# Patient Record
Sex: FEMALE | Race: WHITE | NOT HISPANIC OR LATINO | Employment: FULL TIME | URBAN - METROPOLITAN AREA
[De-identification: names, ages, dates, MRNs, and addresses within clinical notes are randomized per-mention and may not be internally consistent; named-entity substitution may affect disease eponyms.]

---

## 2018-11-25 ENCOUNTER — HOSPITAL ENCOUNTER (EMERGENCY)
Facility: HOSPITAL | Age: 28
Discharge: HOME | End: 2018-11-25
Attending: EMERGENCY MEDICINE | Admitting: EMERGENCY MEDICINE

## 2018-11-25 ENCOUNTER — APPOINTMENT (OUTPATIENT)
Dept: GENERAL RADIOLOGY | Facility: HOSPITAL | Age: 28
End: 2018-11-25

## 2018-11-25 VITALS
WEIGHT: 175 LBS | HEART RATE: 119 BPM | HEIGHT: 62 IN | SYSTOLIC BLOOD PRESSURE: 119 MMHG | DIASTOLIC BLOOD PRESSURE: 77 MMHG | RESPIRATION RATE: 20 BRPM | BODY MASS INDEX: 32.2 KG/M2 | TEMPERATURE: 97.7 F | OXYGEN SATURATION: 95 %

## 2018-11-25 DIAGNOSIS — J06.9 ACUTE URI: Primary | ICD-10-CM

## 2018-11-25 DIAGNOSIS — R07.81 PLEURITIC CHEST PAIN: ICD-10-CM

## 2018-11-25 LAB
ALBUMIN SERPL-MCNC: 4.45 G/DL (ref 3.2–4.8)
ALBUMIN/GLOB SERPL: 1.7 G/DL (ref 1.5–2.5)
ALP SERPL-CCNC: 64 U/L (ref 25–100)
ALT SERPL W P-5'-P-CCNC: 19 U/L (ref 7–40)
ANION GAP SERPL CALCULATED.3IONS-SCNC: 5 MMOL/L (ref 3–11)
AST SERPL-CCNC: 20 U/L (ref 0–33)
BASOPHILS # BLD AUTO: 0.04 10*3/MM3 (ref 0–0.2)
BASOPHILS NFR BLD AUTO: 0.4 % (ref 0–1)
BILIRUB SERPL-MCNC: 0.3 MG/DL (ref 0.3–1.2)
BUN BLD-MCNC: 14 MG/DL (ref 9–23)
BUN/CREAT SERPL: 20.3 (ref 7–25)
CALCIUM SPEC-SCNC: 10 MG/DL (ref 8.7–10.4)
CHLORIDE SERPL-SCNC: 106 MMOL/L (ref 99–109)
CO2 SERPL-SCNC: 26 MMOL/L (ref 20–31)
CREAT BLD-MCNC: 0.69 MG/DL (ref 0.6–1.3)
DEPRECATED RDW RBC AUTO: 44.5 FL (ref 37–54)
EOSINOPHIL # BLD AUTO: 0.7 10*3/MM3 (ref 0–0.3)
EOSINOPHIL NFR BLD AUTO: 7.6 % (ref 0–3)
ERYTHROCYTE [DISTWIDTH] IN BLOOD BY AUTOMATED COUNT: 13.5 % (ref 11.3–14.5)
GFR SERPL CREATININE-BSD FRML MDRD: 101 ML/MIN/1.73
GLOBULIN UR ELPH-MCNC: 2.7 GM/DL
GLUCOSE BLD-MCNC: 97 MG/DL (ref 70–100)
HCT VFR BLD AUTO: 41.6 % (ref 34.5–44)
HGB BLD-MCNC: 13.6 G/DL (ref 11.5–15.5)
IMM GRANULOCYTES # BLD: 0.02 10*3/MM3 (ref 0–0.03)
IMM GRANULOCYTES NFR BLD: 0.2 % (ref 0–0.6)
LYMPHOCYTES # BLD AUTO: 2.54 10*3/MM3 (ref 0.6–4.8)
LYMPHOCYTES NFR BLD AUTO: 27.5 % (ref 24–44)
MCH RBC QN AUTO: 29.8 PG (ref 27–31)
MCHC RBC AUTO-ENTMCNC: 32.7 G/DL (ref 32–36)
MCV RBC AUTO: 91.2 FL (ref 80–99)
MONOCYTES # BLD AUTO: 0.67 10*3/MM3 (ref 0–1)
MONOCYTES NFR BLD AUTO: 7.3 % (ref 0–12)
NEUTROPHILS # BLD AUTO: 5.29 10*3/MM3 (ref 1.5–8.3)
NEUTROPHILS NFR BLD AUTO: 57.2 % (ref 41–71)
PLATELET # BLD AUTO: 321 10*3/MM3 (ref 150–450)
PMV BLD AUTO: 10.9 FL (ref 6–12)
POTASSIUM BLD-SCNC: 3.8 MMOL/L (ref 3.5–5.5)
PROT SERPL-MCNC: 7.1 G/DL (ref 5.7–8.2)
RBC # BLD AUTO: 4.56 10*6/MM3 (ref 3.89–5.14)
SODIUM BLD-SCNC: 137 MMOL/L (ref 132–146)
TROPONIN I SERPL-MCNC: <0.006 NG/ML
WBC NRBC COR # BLD: 9.24 10*3/MM3 (ref 3.5–10.8)

## 2018-11-25 PROCEDURE — 80053 COMPREHEN METABOLIC PANEL: CPT | Performed by: NURSE PRACTITIONER

## 2018-11-25 PROCEDURE — 85025 COMPLETE CBC W/AUTO DIFF WBC: CPT | Performed by: NURSE PRACTITIONER

## 2018-11-25 PROCEDURE — 93005 ELECTROCARDIOGRAM TRACING: CPT

## 2018-11-25 PROCEDURE — 93005 ELECTROCARDIOGRAM TRACING: CPT | Performed by: EMERGENCY MEDICINE

## 2018-11-25 PROCEDURE — 71046 X-RAY EXAM CHEST 2 VIEWS: CPT

## 2018-11-25 PROCEDURE — 84484 ASSAY OF TROPONIN QUANT: CPT | Performed by: NURSE PRACTITIONER

## 2018-11-25 PROCEDURE — 96360 HYDRATION IV INFUSION INIT: CPT

## 2018-11-25 PROCEDURE — 99284 EMERGENCY DEPT VISIT MOD MDM: CPT

## 2018-11-25 RX ORDER — SODIUM CHLORIDE 0.9 % (FLUSH) 0.9 %
10 SYRINGE (ML) INJECTION AS NEEDED
Status: DISCONTINUED | OUTPATIENT
Start: 2018-11-25 | End: 2018-11-25 | Stop reason: HOSPADM

## 2018-11-25 RX ORDER — AZITHROMYCIN 250 MG/1
TABLET, FILM COATED ORAL
Qty: 6 TABLET | Refills: 0 | Status: SHIPPED | OUTPATIENT
Start: 2018-11-25

## 2018-11-25 RX ORDER — NAPROXEN 375 MG/1
375 TABLET ORAL 2 TIMES DAILY PRN
Qty: 14 TABLET | Refills: 0 | Status: SHIPPED | OUTPATIENT
Start: 2018-11-25

## 2018-11-25 RX ADMIN — SODIUM CHLORIDE 1000 ML: 9 INJECTION, SOLUTION INTRAVENOUS at 17:30

## 2018-11-25 NOTE — ED PROVIDER NOTES
Subjective   Ms. Reshma Wu is a 28 year old female who presents to the ED with c/o chest pain. Patient reports that she developed midsternal chest pain this morning around 0100. Tried going to bed, but notes that she was unable to secondary to the pain. Patient laments that this chest pain has remained constant since then and has inhibited her from doing normal activities. This brings the patient to the ED this evening. In the emergency room, patient continues to endorse central chest pain. Describes the pain as a sharp sensation and notes that it worsens with deep breathing and coughing. Patient endorses accompanying chest/nasal congestion, dry mouth, and cough with green sputum production but denies any shortness of breath, fever, or chills. No palpitations. No bladder/bowel changes. Not pregnant (no recent sexual activity). Non-smoker. Past medical history includes hypertension, however patient admits she hasn't taken her medications in at least a month.        History provided by:  Patient  Chest Pain   Pain location:  Substernal area  Pain quality: sharp    Pain radiates to:  Does not radiate  Pain severity:  Moderate  Onset quality:  Sudden  Duration:  16 hours  Timing:  Constant  Progression:  Unchanged  Relieved by:  Nothing  Worsened by:  Deep breathing and coughing  Associated symptoms: cough    Associated symptoms: no abdominal pain, no back pain, no diaphoresis, no fever, no headache, no nausea, no near-syncope, no numbness, no palpitations, no shortness of breath and no vomiting    Risk factors: hypertension and obesity    Risk factors: no diabetes mellitus, no high cholesterol, not male, not pregnant and no smoking        Review of Systems   Constitutional: Negative for chills, diaphoresis and fever.   HENT: Positive for congestion. Negative for sore throat.    Respiratory: Positive for cough. Negative for shortness of breath.    Cardiovascular: Positive for chest pain. Negative for palpitations,  leg swelling and near-syncope.   Gastrointestinal: Negative for abdominal pain, blood in stool, constipation, diarrhea, nausea and vomiting.   Genitourinary: Negative for decreased urine volume, difficulty urinating, dysuria, flank pain, frequency, hematuria and urgency.   Musculoskeletal: Negative for back pain.   Neurological: Negative for numbness and headaches.   All other systems reviewed and are negative.      History reviewed. No pertinent past medical history.    No Known Allergies    Past Surgical History:   Procedure Laterality Date   • BREAST SURGERY     • KNEE SURGERY     • WRIST SURGERY         History reviewed. No pertinent family history.    Social History     Socioeconomic History   • Marital status: Significant Other     Spouse name: Not on file   • Number of children: Not on file   • Years of education: Not on file   • Highest education level: Not on file   Tobacco Use   • Smoking status: Never Smoker   Substance and Sexual Activity   • Alcohol use: Yes     Comment: socially   • Drug use: No   • Sexual activity: Defer         Objective   Physical Exam   Constitutional: She is oriented to person, place, and time. She appears well-developed and well-nourished. No distress.   HENT:   Head: Normocephalic and atraumatic.   Eyes: Conjunctivae are normal. No scleral icterus.   Neck: Normal range of motion. Neck supple.   Cardiovascular: Normal rate, regular rhythm, normal heart sounds and intact distal pulses. Exam reveals no gallop and no friction rub.   No murmur heard.  Pulmonary/Chest: Effort normal and breath sounds normal. No respiratory distress. She has no wheezes. She has no rales. She exhibits no tenderness.   Abdominal: Soft. Bowel sounds are normal. There is no tenderness. There is no guarding.   Musculoskeletal: Normal range of motion.   Neurological: She is alert and oriented to person, place, and time.   Skin: Skin is warm and dry. She is not diaphoretic.   Psychiatric: She has a normal  mood and affect. Her behavior is normal.   Nursing note and vitals reviewed.      Procedures         ED Course       Recent Results (from the past 24 hour(s))   Comprehensive Metabolic Panel    Collection Time: 11/25/18  5:30 PM   Result Value Ref Range    Glucose 97 70 - 100 mg/dL    BUN 14 9 - 23 mg/dL    Creatinine 0.69 0.60 - 1.30 mg/dL    Sodium 137 132 - 146 mmol/L    Potassium 3.8 3.5 - 5.5 mmol/L    Chloride 106 99 - 109 mmol/L    CO2 26.0 20.0 - 31.0 mmol/L    Calcium 10.0 8.7 - 10.4 mg/dL    Total Protein 7.1 5.7 - 8.2 g/dL    Albumin 4.45 3.20 - 4.80 g/dL    ALT (SGPT) 19 7 - 40 U/L    AST (SGOT) 20 0 - 33 U/L    Alkaline Phosphatase 64 25 - 100 U/L    Total Bilirubin 0.3 0.3 - 1.2 mg/dL    eGFR Non African Amer 101 >60 mL/min/1.73    Globulin 2.7 gm/dL    A/G Ratio 1.7 1.5 - 2.5 g/dL    BUN/Creatinine Ratio 20.3 7.0 - 25.0    Anion Gap 5.0 3.0 - 11.0 mmol/L   Troponin    Collection Time: 11/25/18  5:30 PM   Result Value Ref Range    Troponin I <0.006 <=0.039 ng/mL   CBC Auto Differential    Collection Time: 11/25/18  5:30 PM   Result Value Ref Range    WBC 9.24 3.50 - 10.80 10*3/mm3    RBC 4.56 3.89 - 5.14 10*6/mm3    Hemoglobin 13.6 11.5 - 15.5 g/dL    Hematocrit 41.6 34.5 - 44.0 %    MCV 91.2 80.0 - 99.0 fL    MCH 29.8 27.0 - 31.0 pg    MCHC 32.7 32.0 - 36.0 g/dL    RDW 13.5 11.3 - 14.5 %    RDW-SD 44.5 37.0 - 54.0 fl    MPV 10.9 6.0 - 12.0 fL    Platelets 321 150 - 450 10*3/mm3    Neutrophil % 57.2 41.0 - 71.0 %    Lymphocyte % 27.5 24.0 - 44.0 %    Monocyte % 7.3 0.0 - 12.0 %    Eosinophil % 7.6 (H) 0.0 - 3.0 %    Basophil % 0.4 0.0 - 1.0 %    Immature Grans % 0.2 0.0 - 0.6 %    Neutrophils, Absolute 5.29 1.50 - 8.30 10*3/mm3    Lymphocytes, Absolute 2.54 0.60 - 4.80 10*3/mm3    Monocytes, Absolute 0.67 0.00 - 1.00 10*3/mm3    Eosinophils, Absolute 0.70 (H) 0.00 - 0.30 10*3/mm3    Basophils, Absolute 0.04 0.00 - 0.20 10*3/mm3    Immature Grans, Absolute 0.02 0.00 - 0.03 10*3/mm3     Note: In addition  "to lab results from this visit, the labs listed above may include labs taken at another facility or during a different encounter within the last 24 hours. Please correlate lab times with ED admission and discharge times for further clarification of the services performed during this visit.    XR Chest 2 View   Final Result   No acute cardiopulmonary abnormality.       DICTATED:   11/25/2018   EDITED/ls :   11/25/2018        This report was finalized on 11/25/2018 7:00 PM by Sukhdeep Jane.            Vitals:    11/25/18 1626 11/25/18 1840   BP: (!) 154/114 (!) 140/101   BP Location: Left arm    Patient Position: Sitting    Pulse: 119    Resp: 20    Temp: 97.7 °F (36.5 °C)    TempSrc: Oral    SpO2: 96% 97%   Weight: 79.4 kg (175 lb)    Height: 157.5 cm (62\")      Medications   sodium chloride 0.9 % flush 10 mL (not administered)   sodium chloride 0.9 % bolus 1,000 mL (1,000 mL Intravenous New Bag 11/25/18 1730)     ECG/EMG Results (last 24 hours)     Procedure Component Value Units Date/Time    ECG 12 Lead [[id number]] Collected:  11/25/18 1632     Updated:  11/25/18 1632                HEART Score (for prediction of 6-week risk of major adverse cardiac event) reviewed and/or performed as part of the patient evaluation and treatment planning process.  The result associated with this review/performance is: 1           MDM    Final diagnoses:   Acute URI   Pleuritic chest pain       Documentation assistance provided by jana Mccray.  Information recorded by the jana was done at my direction and has been verified and validated by me.     Damien Mccray  11/25/18 1828       Christine Treviño APRN  11/25/18 1933    "

## 2018-11-26 NOTE — DISCHARGE INSTRUCTIONS
Medications as directed.  Follow up with your primary care provider to monitor your recovery.  Thank you